# Patient Record
Sex: FEMALE | Race: OTHER | URBAN - METROPOLITAN AREA
[De-identification: names, ages, dates, MRNs, and addresses within clinical notes are randomized per-mention and may not be internally consistent; named-entity substitution may affect disease eponyms.]

---

## 2020-02-13 ENCOUNTER — LAB ONLY (OUTPATIENT)
Dept: FAMILY MEDICINE CLINIC | Age: 35
End: 2020-02-13

## 2020-02-13 ENCOUNTER — INITIAL PRENATAL (OUTPATIENT)
Dept: FAMILY MEDICINE CLINIC | Age: 35
End: 2020-02-13

## 2020-02-13 VITALS
HEART RATE: 74 BPM | SYSTOLIC BLOOD PRESSURE: 118 MMHG | BODY MASS INDEX: 32.79 KG/M2 | TEMPERATURE: 98.1 F | WEIGHT: 167 LBS | HEIGHT: 60 IN | DIASTOLIC BLOOD PRESSURE: 65 MMHG

## 2020-02-13 DIAGNOSIS — Z23 ENCOUNTER FOR IMMUNIZATION: ICD-10-CM

## 2020-02-13 DIAGNOSIS — G43.001 MIGRAINE WITHOUT AURA AND WITH STATUS MIGRAINOSUS, NOT INTRACTABLE: ICD-10-CM

## 2020-02-13 DIAGNOSIS — N91.2 AMENORRHEA: Primary | ICD-10-CM

## 2020-02-13 DIAGNOSIS — Z13.9 ENCOUNTER FOR SCREENING: Primary | ICD-10-CM

## 2020-02-13 LAB
HCG URINE, QL. (POC): POSITIVE
VALID INTERNAL CONTROL?: YES

## 2020-02-13 NOTE — PROGRESS NOTES
Current pregnancy history:    Susannah Renner is a  30 y.o. female OTHER Patient's last menstrual period was 2020 (exact date). .  She presents for the evaluation of amenorrhea and a positive pregnancy test.  Also c/o HA that comes and goes c/w migraine with mild blurred vision    LMP history:  The date of her LMP is  certain. Her last menstrual period was normal.  A urine pregnancy test was positive      Based on her LMP, her EDC is 10/15/2020 and her EGA is 5 weeks,1 days. Her menstrual cycles are regular and occur approximately every 28 days  and range from 3 to 5 days. Ultrasound data:  She is too early to establish viability today and pt is aware    Pregnancy symptoms:    Since her LMP she has experienced  urinary frequency, breast tenderness, and nausea. She has not been vomiting over the last few weeks. Associated signs and symptoms which she denies: dysuria, discharge, vaginal bleeding. Relevant past pregnancy history:   She has the following pregnancy history: Previous C/S x 2, both in [place]. Female 10 yrs of age and Son 10 yrs of age. Prmary C/S for prolonged Latent Phase of labor without progression. Reports 2nd C/S was for same   She has no history of  delivery. Relevant past medical history:(relevant to this pregnancy): noncontributory. Substance history: negative for alcohol, tobacco and street drugs. Positive for nothing. Exposure history: There is/are no indoor cat/s in the home. The patient was instructed to not change the cat litter. She admits close contact with children on a regular basis. She has had chicken pox or the vaccine in the past.   Patient denies issues with domestic violence. Genetic Screening/Teratology Counseling: (Includes patient, baby's father, or anyone in either family with:)  3.  Patient's age >/= 28 at EDC?--no  2.   [facility] ([facility] [place], [address], or  background): MCV<80?--no.     3. Neural tube defect (meningomyelocele, spina bifida, anencephaly)?--no.   4.  Congenital heart defect?--no.  5.  Down syndrome?--no.   6.  Shane-Sachs ([place], [employer])?--no.   7.  Canavan's Disease?--no.   8.  Familial Dysautonomia?--no.   9.  Sickle cell disease or trait ()? --no   The patient has not been tested for sickle trait  10. Hemophilia or other blood disorders?--no. 11.  Muscular dystrophy?--no. 12.  Cystic fibrosis?--no. 13.  Roger's Chorea?--no. 14.  Mental retardation/autism (if yes was person tested for Fragile X)?--no. 15.  Other inherited genetic or chromosomal disorder?--no. 12.  Maternal metabolic disorder (DM, PKU, etc)?--no. 17.  Patient or FOB with a child with a birth defect not listed above?--no.  17a. Patient or FOB with a birth defect themselves?--no. 18.  Recurrent pregnancy loss, or stillbirth?--no. 19.  Any medications since LMP other than prenatal vitamins (include vitamins, supplements, OTC meds, drugs, alcohol)?--no. 20.  Any other genetic/environmental exposure to discuss?--no. Infection History:  1. Lives with someone with TB or TB exposed?--no.   2.  Patient or partner has history of genital herpes?--no.  3.  Rash or viral illness since LMP?--no.    4.  History of STD (GC, CT, HPV, syphilis, HIV)? --no   5. Other: OTHER? History reviewed. No pertinent past medical history. History reviewed. No pertinent surgical history. Social History     Occupational History    Not on file   Tobacco Use    Smoking status: Never Smoker    Smokeless tobacco: Never Used   Substance and Sexual Activity    Alcohol use: Not Currently    Drug use: Not Currently    Sexual activity: Yes     Partners: Male     Birth control/protection: None     History reviewed. No pertinent family history.   OB History    Para Term  AB Living   3 2 2         SAB TAB Ectopic Molar Multiple Live Births             2      # Outcome Date GA Lbr Ashok/2nd Weight Sex Delivery Anes PTL Lv   3 Current            2 Term            1 Term              No Known Allergies  Prior to Admission medications    Not on File        Review of Systems: History obtained from the patient  Constitutional: negative for weight loss, fever, night sweats  HEENT: negative for hearing loss, earache, congestion, snoring, sore throat  CV: negative for chest pain, palpitations, edema  Resp: negative for cough, shortness of breath, wheezing  Breast: negative for breast lumps, nipple discharge, galactorrhea  GI: negative for change in bowel habits, abdominal pain, black or bloody stools  : negative for frequency, dysuria, hematuria, vaginal discharge  MSK: negative for back pain, joint pain, muscle pain  Skin: negative for itching, rash, hives  Neuro: negative for dizziness, headache, confusion, weakness  Psych: negative for anxiety, depression, change in mood  Heme/lymph: negative for bleeding, bruising, pallor    Objective:  Visit Vitals  /65   Pulse 74   Temp 98.1 °F (36.7 °C)   Ht 5' (1.524 m)   Wt 167 lb (75.8 kg)   LMP 01/08/2020 (Exact Date)   BMI 32.61 kg/m²       Physical Exam:     Constitutional  · Appearance: well-nourished, well developed, alert, in no acute distress    HENT  · Head  · Face: appears normal  · Eyes: appear normal  · Ears: normal  · Mouth: normal  · Lips: no lesions      Chest  · Respiratory Effort: breathing unlabored, LCTAB     Cardiovascular  · Heart:  · Auscultation: regular rate and rhythm without murmur      Gastrointestinal  · Abdominal Examination: abdomen non-tender to palpation, normal bowel sounds, no masses present, C/S scar noted without keloid  · Liver and spleen: no hepatomegaly present, spleen not palpable  · Hernias: no hernias identified  · Neg CVA tenderness Bilat    Genitourinary  · deferred    Skin  · General Inspection: no rash, no lesions identified    Neurologic/Psychiatric  · Mental Status:  · Orientation: grossly oriented to person, place and time  · Mood and Affect: mood normal, affect appropriate    Assessment:   Intrauterine pregnancy with issues addressed in problem list  Prev C/S x 2 with desired Repeat and BTL    Plan:     Course of pregnancy discussed including visit schedule, routine U/S, glucola testing, etc.  Avoid alcoholic beverages and illicit/recreational drugs use  Take prenatal vitamins or folic acid daily. Hospital and practice style discussed with coverage system. Discussed nutrition, toxoplasmosis precautions, sexual activity, exercise, need for influenza vaccine, environmental and work hazards, travel advice, screen for domestic violence, need for seat belts. Discussed seafood, unpasteurized dairy products, deli meat, artificial sweeteners, and caffeine. Discussed current prescription drug use. Given medication list.  Discussed the use of over the counter medications and chemicals. Route of delivery discussed, including risks, benefits     Handouts given to pt. Flu vaccine today  RTO 4 wks for Sono and NOB labs  Increase hydration, Tylenol prn and small amt of caffeine    Dana E. Margaretta Spatz, WHNP/ARTURO

## 2020-02-13 NOTE — PROGRESS NOTES
Results for orders placed or performed in visit on 02/13/20   AMB POC URINE PREGNANCY TEST, VISUAL COLOR COMPARISON   Result Value Ref Range    VALID INTERNAL CONTROL POC Yes     HCG urine, Ql. (POC) Positive Negative     [id number]  Exp:12/31/20    LMP:1/8/20

## 2020-02-13 NOTE — PROGRESS NOTES
Susannah Seymour  Requests flu vaccine. Denies fever and egg allergy. VIS information sheet given to patient. Explained possible s/e. Reviewed s/sx indicating need to be seen in ER. Patient had no adverse reaction at time of discharge. Entered into VIIS. GIVEN BY PATRICIO HAWKINS, [facility]  Edgardo Hodge RN

## 2020-03-06 DIAGNOSIS — N30.01 ACUTE CYSTITIS WITH HEMATURIA: Primary | ICD-10-CM

## 2020-03-06 RX ORDER — NITROFURANTOIN 25; 75 MG/1; MG/1
100 CAPSULE ORAL 2 TIMES DAILY
Qty: 14 CAP | Refills: 0 | Status: SHIPPED | OUTPATIENT
Start: 2020-03-06 | End: 2020-03-13

## 2020-03-06 NOTE — PROGRESS NOTES
Spoke to patient on the phone with [facility]  # [id number]. She states she is having pain with urination and her urine is \"black. \"  Also complains of lower back pain. Arranged to send prescription to Charlene at [facility]. Patient states she does not know where the nearest pharmacy is located. She is concerned about the cost.  Reviewed precautions. Will go to urgent care for fever, increased pain or N/V. She also complains that her feet are very hot. Explained that this is a common complaint for pregnant women. Will see at the [facility] 3/12/20.

## 2020-03-09 PROBLEM — Z34.80 ENCOUNTER FOR SUPERVISION OF OTHER NORMAL PREGNANCY, UNSPECIFIED TRIMESTER: Status: ACTIVE | Noted: 2020-03-09

## 2020-03-12 ENCOUNTER — ROUTINE PRENATAL (OUTPATIENT)
Dept: FAMILY MEDICINE CLINIC | Age: 35
End: 2020-03-12

## 2020-03-12 ENCOUNTER — HOSPITAL ENCOUNTER (OUTPATIENT)
Dept: LAB | Age: 35
Discharge: HOME | End: 2020-03-12

## 2020-03-12 VITALS — SYSTOLIC BLOOD PRESSURE: 114 MMHG | BODY MASS INDEX: 33.88 KG/M2 | DIASTOLIC BLOOD PRESSURE: 60 MMHG | WEIGHT: 173.5 LBS

## 2020-03-12 DIAGNOSIS — O09.91 SUPERVISION OF HIGH-RISK PREGNANCY, FIRST TRIMESTER: Primary | ICD-10-CM

## 2020-03-12 DIAGNOSIS — Z3A.09 9 WEEKS GESTATION OF PREGNANCY: ICD-10-CM

## 2020-03-12 DIAGNOSIS — O09.91 SUPERVISION OF HIGH-RISK PREGNANCY, FIRST TRIMESTER: ICD-10-CM

## 2020-03-12 PROBLEM — Z98.891 HISTORY OF CESAREAN DELIVERY: Status: ACTIVE | Noted: 2020-03-12

## 2020-03-12 LAB
ANTIBODY SCREEN, EXTERNAL: NEGATIVE
BASOPHILS # BLD: 0 K/UL (ref 0–0.1)
BASOPHILS NFR BLD: 0 % (ref 0–1)
CHLAMYDIA, EXTERNAL: NEGATIVE
CHLAMYDIA, EXTERNAL: NORMAL
DIFFERENTIAL METHOD BLD: ABNORMAL
EOSINOPHIL # BLD: 0.2 K/UL (ref 0–0.4)
EOSINOPHIL NFR BLD: 2 % (ref 0–7)
ERYTHROCYTE [DISTWIDTH] IN BLOOD BY AUTOMATED COUNT: 12.7 % (ref 11.5–14.5)
HBSAG, EXTERNAL: NEGATIVE
HCT VFR BLD AUTO: 37 % (ref 35–47)
HCT, EXTERNAL: 37
HGB BLD-MCNC: 11.8 G/DL (ref 11.5–16)
HGB, EXTERNAL: 11.8
HIV, EXTERNAL: NEGATIVE
IMM GRANULOCYTES # BLD AUTO: 0 K/UL (ref 0–0.04)
IMM GRANULOCYTES NFR BLD AUTO: 0 % (ref 0–0.5)
LYMPHOCYTES # BLD: 2.4 K/UL (ref 0.8–3.5)
LYMPHOCYTES NFR BLD: 21 % (ref 12–49)
MCH RBC QN AUTO: 29.9 PG (ref 26–34)
MCHC RBC AUTO-ENTMCNC: 31.9 G/DL (ref 30–36.5)
MCV RBC AUTO: 93.9 FL (ref 80–99)
MONOCYTES # BLD: 0.8 K/UL (ref 0–1)
MONOCYTES NFR BLD: 7 % (ref 5–13)
N. GONORRHEA, EXTERNAL: NEGATIVE
NEUTS SEG # BLD: 8.2 K/UL (ref 1.8–8)
NEUTS SEG NFR BLD: 70 % (ref 32–75)
NRBC # BLD: 0 K/UL (ref 0–0.01)
NRBC BLD-RTO: 0 PER 100 WBC
PLATELET # BLD AUTO: 369 K/UL (ref 150–400)
PLATELET CNT,   EXTERNAL: 369
PMV BLD AUTO: 12.2 FL (ref 8.9–12.9)
RBC # BLD AUTO: 3.94 M/UL (ref 3.8–5.2)
RPR, EXTERNAL: NEGATIVE
TYPE, ABO & RH, EXTERNAL: NORMAL
URINALYSIS, EXTERNAL: NEGATIVE
WBC # BLD AUTO: 11.7 K/UL (ref 3.6–11)

## 2020-03-12 PROCEDURE — 87661 TRICHOMONAS VAGINALIS AMPLIF: CPT

## 2020-03-12 PROCEDURE — 85025 COMPLETE CBC W/AUTO DIFF WBC: CPT

## 2020-03-12 PROCEDURE — 88175 CYTOPATH C/V AUTO FLUID REDO: CPT

## 2020-03-12 PROCEDURE — 87389 HIV-1 AG W/HIV-1&-2 AB AG IA: CPT

## 2020-03-12 PROCEDURE — 86900 BLOOD TYPING SEROLOGIC ABO: CPT

## 2020-03-12 PROCEDURE — 86762 RUBELLA ANTIBODY: CPT

## 2020-03-12 PROCEDURE — 87340 HEPATITIS B SURFACE AG IA: CPT

## 2020-03-12 PROCEDURE — 86592 SYPHILIS TEST NON-TREP QUAL: CPT

## 2020-03-12 PROCEDURE — 87624 HPV HI-RISK TYP POOLED RSLT: CPT

## 2020-03-12 NOTE — PROGRESS NOTES
S: Feeling well. No significant complaints or concerns. Fetal movement not appreciated yet. . Denies ctxs, LOF, or vaginal bldng. Denies travel to [facility] affected area. C/O left hip pain, HA's (eyes burning). Has been treated for UTI    O: See prenatal flowsheet for maternal and fetal exam  Blood pressure 114/60, weight 173 lb 8 oz (78.7 kg), last menstrual period 2020. Informal ultrasound for dating  CRL gives dates of 9w4d with JESSICA 10/11/20. Dates by LMP JESSICA 10/14/20. Will use LMP for dating. A:  9w1d   Size=Dates    P:   Reviewed stretches for hip. Encourage increase water intake for HA's. Reviewed nutrition - eating smaller meals throughout the day.   NOB labs ordered  Orders Placed This Encounter    CULTURE, URINE     Standing Status:   Future     Standing Expiration Date:   2020    CBC WITH AUTOMATED DIFF     Standing Status:   Future     Standing Expiration Date:   2020    RUBELLA AB, IGG     Standing Status:   Future     Standing Expiration Date:   2020    RPR     Standing Status:   Future     Standing Expiration Date:   2020    HIV 1/2 AG/AB, 4TH GENERATION,W RFLX CONFIRM     Standing Status:   Future     Standing Expiration Date:   2020    HEP B SURFACE AG     Standing Status:   Future     Standing Expiration Date:   2020    GLUCOSE, GESTATIONAL 1 HR TOLERANCE     Standing Status:   Future     Standing Expiration Date:   2020    BLOOD TYPE, (ABO+RH)     Standing Status:   Future     Standing Expiration Date:   2020    PAP IG, CT-NG, RFX APTIMA HPV ASCUS ([id number], [id number]))       See prenatal checklist for other topics covered during today's visit  RTO in 5 weeks for JOSEPH with ARTURO

## 2020-03-12 NOTE — PATIENT INSTRUCTIONS
Semanas 6 a 10 de falcon embarazo: Instrucciones de cuidado  Weeks 6 to 10 of Your Pregnancy: Care Instructions  Instrucciones de cuidado    Felicitaciones por falcon embarazo. Ankita momento es muy emocionante e importante para usted. [place] primeras 6 a 10 semanas de [place], falcon cuerpo [place] por muchos cambios. Falcon bebé crece muy rápido, a pesar de que usted no pueda sentirlo aún. Daiva Brieayama a notar que se siente distinta, tanto física merry emocionalmente. Dado que el embarazo de cada jenn es [place], no existe isabela manera correcta de sentirse. Podría sentirse más saludable que nunca o podría sentirse cansada o tener molestias estomacales (\"náuseas matutinas\"). Estas primeras semanas son un tiempo para karl decisiones saludables y comer los mejores alimentos para usted y falcon bebé. Esta hoja de cuidados le dará [employer]. Ankita es también un buen momento para pensar acerca de las pruebas para detectar defectos congénitos. Estas son las pruebas que se hacen shanta el embarazo para buscar posibles problemas con el bebé. Las pruebas del primer trimestre para detectar defectos de nacimiento se pueden hacer entre las semanas 10 y 15 del embarazo, dependiendo de la prueba. Hable con falcon médico acerca de qué tipos de pruebas existen. La atención de seguimiento es isabela parte clave de falcon tratamiento y seguridad. Asegúrese de hacer y acudir a todas las citas, y llame a falcon médico si está teniendo problemas. También es isabela buena idea saber los resultados de ruslan exámenes y mantener isabela lista de los medicamentos que hiram. ¿Cómo puede cuidarse en el hogar? Aliméntese parag  · Coma al menos 3 comidas y 2 refrigerios saludables todos los días. Coma alimentos frescos y enteros, incluyendo:  ? 7 o más porciones de pan, tortillas, cereales, arroz, pastas o ac. ? 3 o más porciones de verduras, en especial las de hojas verdes. ? 2 o más porciones de frutas. ? 3 o más porciones de [place], [place] o [place].   ? 2 o más porciones de carne, pavo, shailesh, pescado, huevos o frijoles (habichuelas) secos. · Alysa abundantes líquidos, sobre todo agua. Evite beber sodas y otras bebidas endulzadas. · Elija alimentos que contengan vitaminas que son importantes para falcon bebé, merry calcio, mackenzie y ácido fólico.  ? Los lácteos, el tofu, el pescado enlatado con espinas, las [place], el brócoli, las verduras de hojas verdes, las tortillas de maíz y el jugo de naranja fortificado son buenas duvall de calcio. ? La carne, las aves, el hígado, la [place], las [place], los frijoles secos, los cereales fortificados y las frutas secas son ricos en mackenzie. ? Las verduras de hojas oscuras, el brócoli, los [place], el hígado, los cereales fortificados, el jugo de [place], los cacahuates [place]) y las almendras son buenas duvall de ácido fólico.  · Evite comer alimentos que podrían hacerle daño al bebé. ? No coma carne, shailesh o pescado crudos o semicocidos (merry sushi u ostras crudas). ? No coma huevos crudos ni alimentos que contengan huevos crudos, merry el aderezo para [place]. ? No coma quesos blandos ni lácteos sin pasteurizar, merry queso \"brie\", feta o queso jake. ? No consuma pescados que [place] de lukas, merry tiburón, pez helen, blanquillo o caballa gigante. No coma más de 6 onzas (170 g) de atún por semana. ? No coma brotes crudos, especialmente de alfalfa. ? Reduzca las bebidas que contengan cafeína, merry el café, el té y las canelo. Protéjase y proteja a falcon bebé  · No toque las minesh de excrementos del [place]. Pueden causarle isabela infección que podría hacerle daño al bebé. · La temperatura corporal bing puede ser perjudicial para falcon bebé. Así es que si [place] usar un sauna o isabela bañera de hidromasaje, asegúrese de hablar con falcon médico acerca de cómo usarlos con seguridad. Cómo lidiar con las náuseas matutinas  · Scott pequeños sorbos de [place], [facility] o batidos.  Pruebe a beber [employer], no con las comidas. · Coma 5 o 6 comidas pequeñas al día. Pruebe con pan marla seco o galletas apenas se levante, y desayune un poco más tarde. · Evite los alimentos picantes, aceitosos o grasosos. · Cuando sienta malestar, rosa las ventanas o salga a caminar para karl aire fresco.  · Pruebe las pulseras antináuseas. Suelen ayudar a [employer]. · Dígale a falcon médico si nazia que ruslan vitaminas prenatales le causan malestar estomacal.  ¿Dónde puede encontrar más información en inglés? Vaya a http://[email or web address]-raymon.info/  [address] en la búsqueda para aprender más acerca de \"Semanas 6 a 10 de falcon embarazo: Instrucciones de cuidado. \"  Revisado: [address] [place], 2019Versión del contenido: 12.4  © 5739-3307 Naila [employer]orporated. Las instrucciones de cuidado fueron adaptadas bajo licencia por Good Help Connections (which disclaims liability or warranty for this information). Si usted tiene [place] afección médica o sobre estas instrucciones, siempre pregunte a falcon profesional de maximilian. [employer] niega toda garantía o responsabilidad por falcon uso de esta información.

## 2020-03-13 LAB
ABO + RH BLD: NORMAL
BLOOD BANK CMNT PATIENT-IMP: NORMAL
HBV SURFACE AG SER QL: 0.14 INDEX
HBV SURFACE AG SER QL: NEGATIVE
HIV 1+2 AB+HIV1 P24 AG SERPL QL IA: NONREACTIVE
HIV12 RESULT COMMENT, HHIVC: NORMAL
RPR SER QL: NONREACTIVE
RUBV IGG SER-IMP: REACTIVE
RUBV IGG SERPL IA-ACNC: 84.8 IU/ML

## 2020-03-17 LAB
C TRACH RRNA SPEC QL NAA+PROBE: NEGATIVE
N GONORRHOEA RRNA SPEC QL NAA+PROBE: NEGATIVE
SPECIMEN SOURCE: NORMAL
T VAGINALIS RRNA SPEC QL NAA+PROBE: NEGATIVE

## 2020-03-26 ENCOUNTER — OFFICE VISIT (OUTPATIENT)
Dept: OBGYN CLINIC | Age: 35
End: 2020-03-26

## 2020-03-26 VITALS — BODY MASS INDEX: 33.86 KG/M2 | DIASTOLIC BLOOD PRESSURE: 63 MMHG | WEIGHT: 173.4 LBS | SYSTOLIC BLOOD PRESSURE: 118 MMHG

## 2020-03-26 DIAGNOSIS — Z98.891 HISTORY OF CESAREAN DELIVERY: ICD-10-CM

## 2020-03-26 DIAGNOSIS — Z3A.11 11 WEEKS GESTATION OF PREGNANCY: Primary | ICD-10-CM

## 2020-03-26 DIAGNOSIS — Z34.80 ENCOUNTER FOR SUPERVISION OF OTHER NORMAL PREGNANCY, UNSPECIFIED TRIMESTER: ICD-10-CM

## 2020-03-26 NOTE — PROGRESS NOTES
S: Feeling well. No significant complaints or concerns. Fetal movement not appreciated yet. Denies ctxs, LOF, or vaginal bldng. Denies recent travel or symptoms of sickness    O: See prenatal flowsheet for maternal and fetal exam  Blood pressure 118/63, weight 173 lb 6.4 oz (78.7 kg), last menstrual period 2020.        A:  11w1d   Size=Dates    P:   Orders Placed This Encounter    CULTURE, URINE    ANTIBODY SCREEN       New OB packet given  Reviewed information  Reviewed virtual visits and prenatal schedule  See prenatal checklist for other topics covered during today's visit  Doxy virtual visit in 4-5 weeks         [id number] via [employer] used during visit

## 2020-03-27 LAB — BLD GP AB SCN SERPL QL: NEGATIVE

## 2020-03-28 LAB — BACTERIA UR CULT: NORMAL

## 2020-06-03 ENCOUNTER — HOSPITAL ENCOUNTER (OUTPATIENT)
Dept: PERINATAL CARE | Age: 35
Discharge: HOME | End: 2020-06-03
Attending: OBSTETRICS & GYNECOLOGY
Payer: SELF-PAY

## 2020-06-03 ENCOUNTER — ROUTINE PRENATAL (OUTPATIENT)
Dept: OBGYN CLINIC | Age: 35
End: 2020-06-03

## 2020-06-03 VITALS
DIASTOLIC BLOOD PRESSURE: 49 MMHG | BODY MASS INDEX: 33.57 KG/M2 | HEIGHT: 60 IN | SYSTOLIC BLOOD PRESSURE: 103 MMHG | WEIGHT: 171 LBS

## 2020-06-03 DIAGNOSIS — Z34.80 ENCOUNTER FOR SUPERVISION OF OTHER NORMAL PREGNANCY, UNSPECIFIED TRIMESTER: Primary | ICD-10-CM

## 2020-06-03 DIAGNOSIS — R93.89 ABNORMAL FINDING ON ULTRASOUND: ICD-10-CM

## 2020-06-03 LAB
Lab: NORMAL
NIPT, EXTERNAL: NORMAL
RUBELLA, EXTERNAL: NORMAL
TOXOPLASMA, EXTERNAL: NORMAL
VARICELLA, EXTERNAL: NORMAL

## 2020-06-03 PROCEDURE — 76805 OB US >/= 14 WKS SNGL FETUS: CPT | Performed by: OBSTETRICS & GYNECOLOGY

## 2020-06-03 NOTE — PROGRESS NOTES
Problem List  Date Reviewed: 2020          Codes Class Noted    History of  delivery ICD-10-CM: Z98.891  ICD-9-CM: V45.89  3/12/2020        Encounter for supervision of other normal pregnancy, unspecified trimester ICD-10-CM: Z34.80  ICD-9-CM: V22.1  3/9/2020              FETAL SURVEY  DECREASED SCAN CLARITY DUE TO MATERNAL BODY HABITUS. A SINGLE VIABLE IUP AT 21W0D GA BY LMP. FETAL CARDIAC MOTION OBSERVED. FETAL ANATOMY VISUALIZED. THERE APPEARS TO BE SKIN EDEMA IN THE FETAL FACE AND CHEST/ABDOMINAL AREA. APPROPRIATE GROWTH MEASURED; SIZE = DATES. SEVERO, CERVIX AND PLACENTA APPEAR WITHIN NORMAL LIMITS. GENDER: XY, PATIENT DOES NOT KNOW.

## 2020-06-03 NOTE — PATIENT INSTRUCTIONS
Semanas 18 a 22 de falcon embarazo: Instrucciones de cuidado Weeks 18 to 22 of Your Pregnancy: Care Instructions Instrucciones de cuidado Falcon bebé continúa desarrollándose rápidamente. En esta etapa, los bebés ya pueden chuparse el pulgar, agarrar firmemente con las [place], y abrir y cerrar los párpados. En algún Jade's 18 y 25, comenzará a sentir que el bebé se [place]. Al principio, estos pequeños movimientos se sentirán merry un aleteo o un vuelo de mariposas. Algunas mujeres dicen que sienten merry burbujas de [place]. A medida que el bebé crece, estos movimientos serán más joaquin. También podría observar que falcon bebé patea y tiene hipo. Shanta zen Solis, podría descubrir que las náuseas y la fatiga desaparecieron. En general, es posible que se sienta mejor y tenga más energía que la que tenía shanta el primer trimestre. Sin embargo, también podría tener nuevas ANDOVER, merry problemas para dormir o calambres en las piernas. Esta hoja de cuidados la ayudará a aliviar esas molestias. La atención de seguimiento es isabela parte clave de falcon tratamiento y seguridad. Asegúrese de hacer y acudir a todas las citas, y llame a falcon médico si está teniendo problemas. También es isabela buena idea saber los resultados de ruslan exámenes y mantener isabela lista de los medicamentos que hiram. Cómo puede cuidarse en el hogar? Alivie los problemas para dormir · Evite la cafeína en las bebidas o los chocolates a última hora del día. · Misbah algo de ejercicio todos los días. · Dúchese o báñese en agua tibia antes de irse a la cama. · Coma un refrigerio liviano o shai un vaso de leche a la hora de dormir. · Misbah ejercicios de relajación en la cama para tranquilizar falcon mente y falcon cuerpo. · Apoye ruslan piernas y falcon espalda con almohadas adicionales. Si duerme de costado, pruebe a ponerse isabela [employer] ruslan piernas. · No use píldoras para dormir ni consuma alcohol. Podrían hacerle daño a falcon bebé. Alivie los [employer] piernas · No masajee mac pantorrilla mientras tiene un calambre. · Siéntese en isabela cama o silla firme. Estire la wilber y [employer] ([facility] el [facility]) despacio, [place] jillian, en dirección a la rodilla. Doble los dedos de los pies hacia arriba y [place]. · Póngase de pie sobre isabela superficie plana y fresca. Estire los dedos de los pies hacia arriba y dé pequeños pasos con el talón. · Use isabela almohadilla térmica o isabela bolsa de [place] para aliviar arnie musculares. Prevenga los calambres en las piernas · Asegúrese de consumir suficiente calcio. Si está preocupada porque no está obteniendo lo suficiente, hable con mac médico. 
· Misbah ejercicio todos los madyson y estire las piernas antes de irse a dormir. · Dese un baño tibio antes de irse a dormir y pruebe a usar calentadores de piernas. Dónde puede encontrar más información en inglés? Darleen Kirby a http://ervin-raymon.info/ [place] U066 en la búsqueda para aprender más acerca de \"Semanas 18 a 22 de mac embarazo: Instrucciones de cuidado. \" Revisado: [address] [place], 2019Versión del contenido: 12.4 © 0048-7025 Healthwise, Incorporated. Las instrucciones de cuidado fueron adaptadas bajo licencia por Good Help Connections (which disclaims liability or warranty for this information). Si usted tiene [place] afección médica o sobre estas instrucciones, siempre pregunte a mac profesional de maximilian. Healthwise, Incorporated niega toda garantía o responsabilidad por mac uso de esta información. Semanas 18 a 22 de mac embarazo: Instrucciones de cuidado Weeks 18 to 22 of Your Pregnancy: Care Instructions Instrucciones de cuidado Mac bebé continúa desarrollándose rápidamente. En esta etapa, los bebés ya pueden chuparse el pulgar, agarrar firmemente con las [place], y abrir y cerrar los párpados.  
En algún Jade's 18 y 25, comenzará a sentir que el bebé se mueve. Al principio, estos pequeños movimientos se sentirán merry un aleteo o un vuelo de mariposas. Algunas mujeres dicen que sienten merry burbujas de [place]. A medida que el bebé crece, estos movimientos serán más joaquin. También podría observar que falcon bebé patea y tiene hipo. Shanta zen Solis, podría descubrir que las náuseas y la fatiga desaparecieron. En general, es posible que se sienta mejor y tenga más energía que la que tenía shanta el primer trimestre. Sin embargo, también podría tener nuevas ANDOVER, merry problemas para dormir o calambres en las piernas. Esta hoja de cuidados la ayudará a aliviar esas molestias. La atención de seguimiento es isabela parte clave de falcon tratamiento y seguridad. Asegúrese de hacer y acudir a todas las citas, y llame a falcon médico si está teniendo problemas. También es isabela buena idea saber los resultados de ruslan exámenes y mantener isabela lista de los medicamentos que hiram. Cómo puede cuidarse en el hogar? Alivie los problemas para dormir · Evite la cafeína en las bebidas o los chocolates a última hora del día. · Misbah algo de ejercicio todos los días. · Dúchese o báñese en agua tibia antes de irse a la cama. · Coma un refrigerio liviano o shai un vaso de leche a la hora de dormir. · Misbah ejercicios de relajación en la cama para tranquilizar falcon mente y falcon cuerpo. · Apoye ruslan piernas y falcon espalda con almohadas adicionales. Si duerme de costado, pruebe a ponerse isabela [employer] ruslan piernas. · No use píldoras para dormir ni consuma alcohol. Podrían hacerle daño a falcon bebé. Alivie los [employer] pierclaudio · No masajee falcon pantorrilla mientras tiene un calambre. · Siéntese en isabela cama o silla firme. Estire la wilber y [place] ([facility] el [facility]) despacio, [place] jillian, en dirección a la rodilla. Doble los dedos de los pies hacia jillian y Shellie ALEJANDRA. · Póngase de pie sobre isabela superficie plana y fresca.  Estire los dedos de los pies hacia arriba y dé pequeños pasos con el talón. · Use isabela almohadilla térmica o isabela bolsa de [place] para aliviar arnie musculares. Prevenga los calambres en las piernas · Asegúrese de consumir suficiente calcio. Si está preocupada porque no está obteniendo lo suficiente, hable con falcon médico. 
· Misbah ejercicio todos los madyson y estire las piernas antes de irse a dormir. · Dese un baño tibio antes de irse a dormir y pruebe a usar calentadores de piernas. Dónde puede encontrar más información en inglés? Jose Elias tim http://[email or web address]-raymon.info/ Isela Webb A381 en la búsqueda para aprender más acerca de \"Semanas 18 a 22 de falcon embarazo: Instrucciones de cuidado. \" Revisado: [address] [place], 2019Versión del contenido: 12.4 © 0987-2310 Healthwise, [employer]. Las instrucciones de cuidado fueron adaptadas bajo licencia por Good Help Connections (which disclaims liability or warranty for this information). Si usted tiene [place] afección médica o sobre estas instrucciones, siempre pregunte a falcon profesional de maximilian. [place], [employer] niega toda garantía o responsabilidad por falcon uso de esta información.

## 2020-06-05 LAB
B19V IGG SER IA-ACNC: 0.2 INDEX (ref 0–0.8)
B19V IGM SER IA-ACNC: 0.2 INDEX (ref 0–0.8)
CMV IGG SERPL IA-ACNC: >10 U/ML (ref 0–0.59)
CMV IGM SERPL IA-ACNC: <30 AU/ML (ref 0–29.9)
COMMENT, 096659: NORMAL
HSV1 IGG SER IA-ACNC: 12.2 INDEX (ref 0–0.9)
HSV1+2 IGM SER IA-ACNC: 1.98 RATIO (ref 0–0.9)
HSV2 IGG SER IA-ACNC: 11.1 INDEX (ref 0–0.9)
RUBV IGG SERPL IA-ACNC: 3.63 INDEX
RUBV IGM SER IA-ACNC: <20 AU/ML (ref 0–19.9)
SARS-COV-2 AB, IGG, CORG1M: NEGATIVE
T GONDII IGG SERPL IA-ACNC: 127 IU/ML (ref 0–7.1)
T GONDII IGM SER IA-ACNC: <3 AU/ML (ref 0–7.9)
VZV IGG SER IA-ACNC: 1525 INDEX

## 2020-06-16 DIAGNOSIS — Z34.80 ENCOUNTER FOR SUPERVISION OF OTHER NORMAL PREGNANCY, UNSPECIFIED TRIMESTER: ICD-10-CM

## 2020-06-18 ENCOUNTER — ROUTINE PRENATAL (OUTPATIENT)
Dept: OBGYN CLINIC | Age: 35
End: 2020-06-18

## 2020-06-18 VITALS
DIASTOLIC BLOOD PRESSURE: 64 MMHG | HEIGHT: 60 IN | WEIGHT: 169 LBS | SYSTOLIC BLOOD PRESSURE: 110 MMHG | BODY MASS INDEX: 33.18 KG/M2

## 2020-06-18 DIAGNOSIS — Z34.80 ENCOUNTER FOR SUPERVISION OF OTHER NORMAL PREGNANCY, UNSPECIFIED TRIMESTER: Primary | ICD-10-CM

## 2020-06-18 DIAGNOSIS — R31.9 HEMATURIA, UNSPECIFIED TYPE: ICD-10-CM

## 2020-06-18 LAB
GTT, 1 HR, GLUCOLA, EXTERNAL: 115
HCT, EXTERNAL: 30.9
HGB, EXTERNAL: 11.5
PLATELET CNT,   EXTERNAL: 309
URINALYSIS, EXTERNAL: NORMAL

## 2020-06-18 NOTE — PROGRESS NOTES
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 so she can have BTL at the same time.  Due to convenience she wants to continue her [facility] at [facility] for now and we will transfer at end of pregnancy for her CS

## 2020-06-18 NOTE — PATIENT INSTRUCTIONS
[employer] Knowledge Desk: [phone] Weeks 22 to 26 of Your Pregnancy: Care Instructions Your Care Instructions As you enter your 7th month of pregnancy at week 26, your baby's lungs are growing stronger and getting ready to breathe. You may notice that your baby responds to the sound of your or your partner's voice. You may also notice that your baby does less turning and twisting and more squirming or jerking. Jerking often means that your baby has the hiccups. Hiccups are perfectly normal and are only temporary. You may want to think about attending a childbirth preparation class. This is also a good time to start thinking about whether you want to have pain medicine during labor. Most pregnant women are tested for gestational diabetes between weeks 25 and 28. Gestational diabetes occurs when your blood sugar level gets too high when you're pregnant. The test is important, because you can have gestational diabetes and not know it. But the condition can cause problems for your baby. Follow-up care is a key part of your treatment and safety. Be sure to make and go to all appointments, and call your doctor if you are having problems. It's also a good idea to know your test results and keep a list of the medicines you take. How can you care for yourself at home? Ease discomfort from your baby's kicking · Change your position. Sometimes this will cause your baby to change position too. · Take a deep breath while you raise your arm over your head. Then breathe out while you drop your arm. Do Kegel exercises to prevent urine from leaking · You can do Kegel exercises while you stand or sit. ? Squeeze the same muscles you would use to stop your urine. Your belly and thighs should not move. ? Hold the squeeze for 3 seconds, and then relax for 3 seconds. ? Start with 3 seconds. Then add 1 second each week until you are able to squeeze for 10 seconds. ? Repeat the exercise 10 to 15 times for each session. Do three or more sessions each day. Ease or reduce swelling in your feet, ankles, hands, and fingers · If your fingers are puffy, take off your rings. · Do not eat high-salt foods, such as potato chips. · Prop up your feet on a stool or couch as much as possible. Sleep with pillows under your feet. · Do not stand for long periods of time or wear tight shoes. · Wear support stockings. Where can you learn more? Go to http://[email or web address].info/ Enter G264 in the search box to learn more about \"Weeks 22 to 26 of Your Pregnancy: Care Instructions. \" Current as of: May 29, 2019Content Version: 12.4 © 3367-1545 Healthwise, Incorporated. Care instructions adapted under license by [employer] (which disclaims liability or warranty for this information). If you have questions about a medical condition or this instruction, always ask your healthcare professional. [address] any warranty or liability for your use of this information.

## 2020-06-21 LAB
BACTERIA UR CULT: ABNORMAL
ERYTHROCYTE [DISTWIDTH] IN BLOOD BY AUTOMATED COUNT: 12.6 % (ref 11.7–15.4)
GLUCOSE 1H P 50 G GLC PO SERPL-MCNC: 115 MG/DL (ref 65–139)
HCT VFR BLD AUTO: 30.9 % (ref 34–46.6)
HGB BLD-MCNC: 10.4 G/DL (ref 11.1–15.9)
MCH RBC QN AUTO: 30.2 PG (ref 26.6–33)
MCHC RBC AUTO-ENTMCNC: 33.7 G/DL (ref 31.5–35.7)
MCV RBC AUTO: 90 FL (ref 79–97)
PLATELET # BLD AUTO: 309 X10E3/UL (ref 150–450)
RBC # BLD AUTO: 3.44 X10E6/UL (ref 3.77–5.28)
WBC # BLD AUTO: 7.4 X10E3/UL (ref 3.4–10.8)

## 2020-06-22 RX ORDER — CEPHALEXIN 500 MG/1
500 CAPSULE ORAL 3 TIMES DAILY
Qty: 21 CAP | Refills: 0 | Status: SHIPPED | OUTPATIENT
Start: 2020-06-22 | End: 2020-06-29

## 2020-07-02 ENCOUNTER — TELEPHONE (OUTPATIENT)
Dept: OBGYN CLINIC | Age: 35
End: 2020-07-02

## 2020-07-02 DIAGNOSIS — Z34.80 ENCOUNTER FOR SUPERVISION OF OTHER NORMAL PREGNANCY, UNSPECIFIED TRIMESTER: ICD-10-CM

## 2020-07-02 NOTE — TELEPHONE ENCOUNTER
----- Message from Rose Schneider LPN sent at 0/3/8126  8:55 AM EDT -----  Updated PN flowsheet and problem list    Sent to [facility] to call the patient.

## 2020-07-15 ENCOUNTER — ROUTINE PRENATAL (OUTPATIENT)
Dept: OBGYN CLINIC | Age: 35
End: 2020-07-15

## 2020-07-15 ENCOUNTER — HOSPITAL ENCOUNTER (OUTPATIENT)
Dept: PERINATAL CARE | Age: 35
Discharge: HOME | End: 2020-07-15
Attending: OBSTETRICS & GYNECOLOGY

## 2020-07-15 VITALS
WEIGHT: 176 LBS | DIASTOLIC BLOOD PRESSURE: 53 MMHG | HEIGHT: 60 IN | BODY MASS INDEX: 34.55 KG/M2 | SYSTOLIC BLOOD PRESSURE: 102 MMHG

## 2020-07-15 DIAGNOSIS — Z34.80 ENCOUNTER FOR SUPERVISION OF OTHER NORMAL PREGNANCY, UNSPECIFIED TRIMESTER: Primary | ICD-10-CM

## 2020-07-15 DIAGNOSIS — Z23 ENCOUNTER FOR IMMUNIZATION: ICD-10-CM

## 2020-07-15 NOTE — PROGRESS NOTES
Mery Wheeler is a 30 y.o. female who presents for routine immunizations. She denies any symptoms , reactions or allergies that would exclude them from being immunized today. Risks and adverse reactions were discussed and the VIS was given to them. All questions were addressed. She was observed for 10 min post injection. There were no reactions observed.     Jose David Xiao

## 2020-07-15 NOTE — PROGRESS NOTES
Baby moving  Saw MFM EFW 63%tile and has fu in 6 weeks  Tdap today  glucola normal  Wants CS at [facility] so can have BTL

## 2020-07-15 NOTE — PROGRESS NOTES
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

## 2020-07-15 NOTE — PATIENT INSTRUCTIONS
Semanas 26 a 30 de falcon embarazo: Instrucciones de cuidado  Weeks 26 to 30 of Your Pregnancy: Care Instructions  Instrucciones de cuidado    Ahora usted se encuentra en el último trimestre del [facility]. Falcon bebé está creciendo rápidamente. Y es probable que sienta que falcon bebé se mueve con más frecuencia. Es posible que falcon médico le diga que cuente la cantidad de patadas que da falcon bebé. La espalda puede dolerle mientras falcon cuerpo se acostumbra al tamaño y a la longitud de falcon bebé. Si todavía no le schofield aplicado la vacuna Tdap (tétanos, difteria y tos [place]) shanta zen [facility], hable con falcon médico acerca de aplicársela. Arpita Mock a proteger a falcon recién nacido contra la infección por tos ferina. Shanta zen tiempo, es importante que se cuide y preste atención a lo que falcon cuerpo necesita. Si tiene [employer], busque formas de estar con falcon cathleen que satisfagan falcon nivel de comodidad y deseo. Utilice las recomendaciones proporcionadas en esta hoja de cuidados para encontrar falcon propia manera de vivir la sexualidad. La atención de seguimiento es isabela parte clave de falcon tratamiento y seguridad. Asegúrese de hacer y acudir a todas las citas, y llame a falcon médico si está teniendo problemas. También es isabela buena idea saber los resultados de ruslan exámenes y mantener isabela lista de los medicamentos que hiram. ¿Cómo puede cuidarse en el hogar? Andale falcon trabajo con calma  · Tómese descansos frecuentes. Si es posible, deje de trabajar cuando esté cansada y descanse shanta la hora del almuerzo. · Vaya al baño cada 2 horas. · Cambie de posición con frecuencia. Si está sentada shanta mucho tiempo, póngase de pie y camine. · Si está maddox shanta mucho tiempo, apoye un pie sobre un banco bajo, flexionando la rodilla. Después de estar maddox shanta mucho tiempo, siéntese con los pies elevados.   · Evite las [place], las sustancias químicas y el humo del tabaco.  Viva falcon sexualidad a falcon manera  · Apteegi 1 shanta el [employer], a menos que falcon médico le diga que no. · Podría tener un gran deseo sexual o estar completamente desinteresada. · El abdomen cada vez más garrett podría hacer difícil encontrar isabela buena posición shanta el coito. Experimente y explore. · Cuando falcon cathleen le toque los senos, podría tener contracciones en [place]. · Un masaje en la espalda podría aliviar el dolor de espalda o los cólicos que a veces se sienten después del [place]. Aprenda sobre el trabajo de parto prematuro  · Esté alerta a las señales del trabajo de parto prematuro. Es posible que esté comenzando el [place] de parto si:  ? Tiene cólicos similares a los del período menstrual, con o sin náuseas. ? Tiene aproximadamente 4 contracciones o más en 20 minutos, o alrededor de 8 o más en 1 hora, incluso después de jai tomado un vaso de agua y estar en reposo. ? Tiene un dolor sordo (leve brody continuo) en la norma baja de la espalda que no desaparece cuando cambia de posición. ? Siente dolor o presión en la pelvis que aparece y desaparece con determinada regularidad. ? Tiene espasmos intestinales o síntomas similares a los de la gripe, con o sin diarrea. ? Nota un aumento o un cambio en el flujo vaginal. El flujo podría ser Myrene Ferns, tener consistencia mucosa, ser Jerone Balsam rastros de [place]. ? Se le rompe la natalya. · Si nazia que ha comenzado un trabajo de parto prematuro:  ? Alysa 2 o 3 vasos de agua o jugo. No beber suficientes líquidos puede provocar contracciones. ? Detenga lo que esté haciendo, y vacíe la vejiga. Luego, acuéstese sobre el lado pramod shanta al menos 1 hora. ? Mientras descansa de joan, ubique falcon esternón. Coloque los dedos en el punto blando alberto debajo de él. Mueva los dedos hacia abajo en dirección al ombligo para encontrar la parte superior del [place]. Compruebe si está tenso. ? Las contracciones pueden ser débiles o intensas. Registre ruslan contracciones shanta isabela hora.  Cuente isabela contracción desde el comienzo de isabela hasta el comienzo de [place]. ? Enma Mercy contracciones joaquin que no ocurren con la regularidad de un patrón reciben el nombre de contracciones de [place]. Estas son \"contracciones de práctica\", brody no indican el inicio del [place]. Por lo general, se detienen si cambia de [place]. ? Si tiene contracciones regulares, llame a mac médico.  ¿Dónde puede encontrar más información en inglés? Nikki tim [email or web address].info/  Cuco [id number] en la búsqueda para aprender más acerca de \"Semanas 26 a 30 de mac embarazo: Instrucciones de cuidado. \"  Revisado: 11 febrero, 2020               Versión del contenido: 12.5  © 6173-0179 Healthwise, Incorporated. Las instrucciones de cuidado fueron adaptadas bajo licencia por Good [employer] (which disclaims liability or warranty for this information). Si usted tiene Sheridan Mims afección médica o sobre estas instrucciones, siempre pregunte a mac profesional de maximilian. Healthwise, Incorporated niega toda garantía o responsabilidad por mac uso de esta información. Semanas 26 a 30 de mac embarazo: Instrucciones de cuidado  Weeks 26 to 30 of Your Pregnancy: Care Instructions  Instrucciones de cuidado    Ahora usted se encuentra en el último trimestre del Eliana Damon. Mac bebé está creciendo rápidamente. Y es probable que sienta que mac bebé se mueve con más frecuencia. Es posible que mac médico le diga que cuente la cantidad de patadas que da mac bebé. La espalda puede dolerle mientras mac cuerpo se acostumbra al tamaño y a la longitud de mac bebé. Si todavía no le schofield aplicado la vacuna Tdap (tétanos, difteria y tos [place]) shanta zen Eliana Damon, hable con mac médico acerca de aplicársela. Guillermo Katherine proteger a mac recién nacido contra la infección por tos ferina. Shanta zen tiempo, es importante que se cuide y preste atención a lo que mac cuerpo necesita.  Si tiene [employer], busque formas de estar con falcon cathleen que satisfagan falcon nivel de comodidad y deseo. Utilice las recomendaciones proporcionadas en esta hoja de cuidados para encontrar falcon propia manera de vivir la sexualidad. La atención de seguimiento es isabela parte clave de falcon tratamiento y seguridad. Asegúrese de hacer y acudir a todas las citas, y llame a falcon médico si está teniendo problemas. También es isabela buena idea saber los resultados de ruslan exámenes y mantener isabela lista de los medicamentos que hiram. ¿Cómo puede cuidarse en el hogar? [place] falcon trabajo con calma  · Tómese descansos frecuentes. Si es posible, deje de trabajar cuando esté cansada y descanse shanta la hora del almuerzo. · Vaya al baño cada 2 horas. · Cambie de posición con frecuencia. Si está sentada shanta mucho tiempo, póngase de pie y camine. · Si está maddox shanta mucho tiempo, apoye un pie sobre un banco bajo, flexionando la rodilla. Después de estar maddox shanta mucho tiempo, siéntese con los pies elevados. · Evite las [employer], las sustancias químicas y el humo del tabaco.  Viva falcon sexualidad a falcon manera  · [place] relaciones sexuales shanta el embarazo está parag, a menos que falcon médico le diga que no. · Podría tener un gran deseo sexual o estar completamente desinteresada. · El abdomen cada vez más garrett podría hacer difícil encontrar isabela buena posición shanta el coito. Experimente y explore. · Cuando falcon cathleen le toque los senos, podría tener contracciones en [place]. · Un masaje en la espalda podría aliviar el dolor de espalda o los cólicos que a veces se sienten después del [place]. Aprenda sobre el trabajo de parto prematuro  · Esté alerta a las señales del trabajo de parto prematuro. Es posible que esté comenzando el [place] de parto si:  ? Tiene cólicos similares a los del período menstrual, con o sin náuseas.   ? Tiene aproximadamente 4 contracciones o más en 20 minutos, o alrededor de 8 o más en 1 hora, incluso después de jai tomado un vaso de agua y estar en reposo. ? Tiene un dolor sordo (leve brody continuo) en la norma baja de la espalda que no desaparece cuando cambia de posición. ? Siente dolor o presión en la pelvis que aparece y desaparece con determinada regularidad. ? Tiene espasmos intestinales o síntomas similares a los de la gripe, con o sin diarrea. ? Nota un aumento o un cambio en el flujo vaginal. El flujo podría ser [place], tener consistencia mucosa, ser Luna Coho rastros de [place]. ? Se le rompe la natalya. · Si nazia que ha comenzado un trabajo de parto prematuro:  ? Alysa 2 o 3 vasos de agua o jugo. No beber suficientes líquidos puede provocar contracciones. ? Detenga lo que esté haciendo, y vacíe la vejiga. Luego, acuéstese sobre el lado pramod shanta al menos 1 hora. ? Mientras descansa de costado, ubique falcon esternón. Coloque los dedos en el punto blando alberto debajo de él. Mueva los dedos hacia abajo en dirección al ombligo para encontrar la parte superior del [place]. Compruebe si está tenso. ? Las contracciones pueden ser débiles o intensas. Registre ruslan contracciones shanta isabela hora. Cuente isabela contracción desde el comienzo de isabela hasta el comienzo de [place]. ? Doc Netter contracciones joaquin que no ocurren con la regularidad de un patrón reciben el nombre de contracciones de [place]. Estas son \"contracciones de práctica\", brody no indican el inicio del [place]. Por lo general, se detienen si cambia de [place]. ? Si tiene contracciones regulares, llame a falcon médico.  ¿Dónde puede encontrar más información en inglés? Luh Cabrera a [email or web address].[email or web address]  Liang Hilario X402 en la búsqueda para aprender más acerca de \"Semanas 26 a 30 de falcon embarazo: Instrucciones de cuidado. \"  Revisado: 11 febrero, 2020               Versión del contenido: 12.5  © 2774-1927 Healthwise, Incorporated.    Las instrucciones de cuidado fueron adaptadas bajo licencia por [employer] (which disclaims liability or warranty for this information). Si usted tiene Landrum Criders afección médica o sobre estas instrucciones, siempre pregunte a falcon profesional de maximilian. [facility], Incorporated niega toda garantía o responsabilidad por falcon uso de esta información.

## 2020-07-17 LAB
BACTERIA UR CULT: ABNORMAL
BACTERIA UR CULT: ABNORMAL

## 2020-07-17 RX ORDER — CEPHALEXIN 500 MG/1
500 CAPSULE ORAL 3 TIMES DAILY
Qty: 21 CAP | Refills: 0 | Status: SHIPPED | OUTPATIENT
Start: 2020-07-17

## 2020-07-22 ENCOUNTER — TELEPHONE (OUTPATIENT)
Dept: OBGYN CLINIC | Age: 35
End: 2020-07-22

## 2020-07-22 NOTE — TELEPHONE ENCOUNTER
----- Message from Joanne Ramirez LPN sent at 6/57/2329  9:52 AM EDT -----  Will forward to [facility] to contact patient.

## 2020-07-22 NOTE — TELEPHONE ENCOUNTER
----- Message from Abbe Gaucher, MD sent at 7/17/2020  4:45 PM EDT -----  UTI - rx sent to pharmacy  Please contact patient and make sure she takes it - speaks Estonian

## 2020-07-22 NOTE — TELEPHONE ENCOUNTER
Patient notified and stated she already picked up Rx and completed them. She verbalized understanding.

## 2020-07-27 DIAGNOSIS — Z36.0 ENCOUNTER FOR ANTENATAL SCREENING FOR CHROMOSOMAL ANOMALIES: ICD-10-CM

## 2020-07-27 DIAGNOSIS — Z34.80 ENCOUNTER FOR SUPERVISION OF OTHER NORMAL PREGNANCY, UNSPECIFIED TRIMESTER: Primary | ICD-10-CM

## 2020-08-13 ENCOUNTER — ROUTINE PRENATAL (OUTPATIENT)
Dept: OBGYN CLINIC | Age: 35
End: 2020-08-13

## 2020-08-13 VITALS
DIASTOLIC BLOOD PRESSURE: 60 MMHG | BODY MASS INDEX: 34.95 KG/M2 | SYSTOLIC BLOOD PRESSURE: 112 MMHG | HEIGHT: 60 IN | WEIGHT: 178 LBS

## 2020-08-13 DIAGNOSIS — Z34.80 ENCOUNTER FOR SUPERVISION OF OTHER NORMAL PREGNANCY, UNSPECIFIED TRIMESTER: Primary | ICD-10-CM

## 2020-08-13 PROCEDURE — MISCGLOBALOB GLOBAL OB: Performed by: OBSTETRICS & GYNECOLOGY

## 2020-08-13 NOTE — PROGRESS NOTES
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

## 2020-08-13 NOTE — PATIENT INSTRUCTIONS
Semanas 30 a 32 de falcon embarazo: Instrucciones de cuidado  Weeks 30 to 32 of Your Pregnancy: Care Instructions  Instrucciones de cuidado    Ha llegado a los últimos meses de embarazo. A [place], falcon bebé en verdad comienza a tener la apariencia de un bebé, con cassandra y piel rellenita. A medida que entra en las últimas [facility]anas [facility] comenzará a caer en la realidad de que va a tener un bebé. Ankita es el momento de elegir un nombre, ordenar falcon casa, organizar un cuarto de niños seguro y buscar atención infantil de calidad, de ser necesaria. Hacer esto con anterioridad le permitirá concentrarse en cuidar y disfrutar de falcon bebé. También podría hacer isabela visita a la eligio de partos del hospital para [place]  mejor idea sobre qué esperar mientras está en el hospital.  Shanta estos últimos meses, es muy importante que se cuide y preste atención a lo que falcon cuerpo necesita. Si falcon médico le dice que está parag que trabaje, no se exija demasiado. Siga las recomendaciones proporcionadas en esta hoja de cuidados para aliviar la acidez estomacal y [address] várices. Si todavía no le schofield aplicado la vacuna Tdap (tétanos, difteria y tos [place]) shanta ankita [facility], hable con falcon médico acerca de aplicársela. Juanito Robert a proteger a falcon recién nacido contra la infección por tos ferina. La atención de seguimiento es isabela parte clave de falcon tratamiento y seguridad. Asegúrese de hacer y acudir a todas las citas, y llame a falcon médico si está teniendo problemas. También es isabela buena idea saber los resultados de ruslan exámenes y mantener isabela lista de los medicamentos que hiram. ¿Cómo puede cuidarse en el hogar? Preste atención a los movimientos de falcon bebé  · Debería sentir que falcon bebé se mueve varias veces al día. · Falcon bebé ahora cambia menos de posición, y patea y da golpes con más frecuencia. · Falcon bebé duerme de 20 a 45 minutos cada [facility] y [address] en determinados momentos del día.   · Si falcon médico quiere que cuente las patadas de falcon bebé:  ? Emelia [place] falcon vejiga y acuéstese de lado o recuéstese en isabela silla cómoda. ? Anote la hora inicial.  ? Preste atención solo a los movimientos de falcon bebé. Cuente todos los movimientos, excepto los del hipo. ? Después de que haya contado 10 movimientos, anote la hora. ? Anote cuántos minutos le llevaron a falcon bebé los 10 movimientos. ? Si después de isabela hora no ha registrado 10 movimientos, coma o shai algo, y luego cuente por otra hora. Si no registra 10 movimientos en cualquiera de las horas, llame a falcon médico.  Alivie la acidez estomacal  · Coma comidas pequeñas y frecuentes. · No coma chocolate, menta ni comidas muy picantes. Evite las bebidas con cafeína, merry el café, el té y las sodas. · Evite doblarse hacia adelante o acostarse después de comer. · Misbah isabela caminata corta después de comer. · Si tiene problemas de [place] estomacal shanta la noche, no coma por 2 horas antes de acostarse. · [place] antiácidos, merry Mylanta, Maalox, Rolaids o Tums. No tome antiácidos que contengan bicarbonato de sodio. Cuide las várices  · Las várices son vasos sanguíneos que se dilatan debido a la mayor cantidad de aixa shanta el embarazo. Puede tener dolor o palpitaciones en las piernas. La mayoría de las várices desaparecerán después del [place]. · Evite estar maddox shanta mucho tiempo. Siéntese con las piernas cruzadas a la altura de los tobillos, no de las rodillas. · Siéntese con los pies elevados. · Evite usar ropa o medias ajustadas. Use medias de compresión. · Misbah ejercicio con regularidad. Trate de caminar por lo menos 30 minutos al día. ¿Dónde puede encontrar más información en inglés? Vaya a [email or web address].info/  Cuco [id number] en la búsqueda para aprender más acerca de \"Semanas 30 a 28 de falcon embarazo: Instrucciones de cuidado. \"  Revisado: 29 leavitt, 2019Versión del contenido: 12.4  © 1398-4986 [employer].   [address] fueron adaptadas bajo licencia por Good [facility] Connections (which disclaims liability or warranty for this information). Si usted tiene [place] afección médica o sobre estas instrucciones, siempre pregunte a falcon profesional de maximilian. [facility], Incorporated niega toda garantía o responsabilidad por falcon uso de esta información.

## 2020-08-26 ENCOUNTER — ROUTINE PRENATAL (OUTPATIENT)
Dept: OBGYN CLINIC | Age: 35
End: 2020-08-26

## 2020-08-26 ENCOUNTER — HOSPITAL ENCOUNTER (OUTPATIENT)
Dept: PERINATAL CARE | Age: 35
Discharge: HOME | End: 2020-08-26
Attending: OBSTETRICS & GYNECOLOGY

## 2020-08-26 VITALS
DIASTOLIC BLOOD PRESSURE: 77 MMHG | BODY MASS INDEX: 33.46 KG/M2 | SYSTOLIC BLOOD PRESSURE: 122 MMHG | WEIGHT: 177.2 LBS | HEIGHT: 61 IN

## 2020-08-26 DIAGNOSIS — Z34.80 ENCOUNTER FOR SUPERVISION OF OTHER NORMAL PREGNANCY, UNSPECIFIED TRIMESTER: ICD-10-CM

## 2020-08-26 PROCEDURE — 76805 OB US >/= 14 WKS SNGL FETUS: CPT | Performed by: OBSTETRICS & GYNECOLOGY

## 2020-08-26 PROCEDURE — 0502F SUBSEQUENT PRENATAL CARE: CPT | Performed by: OBSTETRICS & GYNECOLOGY

## 2020-08-26 NOTE — PROGRESS NOTES
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

## 2020-08-26 NOTE — PATIENT INSTRUCTIONS
Weeks 32 to 34 of Your Pregnancy: Care Instructions  Your Care Instructions     During the last few weeks of your pregnancy, you may have more aches and pains. It's important to rest when you can. Your growing baby is putting more pressure on your bladder. So you may need to urinate more often. Hemorrhoids are also common. These are painful, itchy veins in the rectal area. In the 36th week, most women have a test for group B streptococcus (GBS). GBS is a common bacteria that can live in the vagina and rectum. It can make your baby sick after birth. If you test positive, you will get antibiotics during labor. These will keep your baby from getting the bacteria. You may want to talk with your doctor about banking your baby's umbilical cord blood. This is the blood left in the cord after birth. If you want to save this blood, you must arrange it ahead of time. You can't decide at the last minute. If you haven't already had the Tdap shot during this pregnancy, talk to your doctor about getting it. It will help protect your  against pertussis infection. Follow-up care is a key part of your treatment and safety. Be sure to make and go to all appointments, and call your doctor if you are having problems. It's also a good idea to know your test results and keep a list of the medicines you take. How can you care for yourself at home? Ease hemorrhoids  · Get more liquids, fruits, vegetables, and fiber in your diet. This will help keep your stools soft. · Avoid sitting for too long. Lie on your left side several times a day. · Clean yourself with soft, moist toilet paper. Or you can use witch hazel pads or personal hygiene pads. · If you are uncomfortable, try ice packs. Or you can sit in a warm sitz bath. Do these for 20 minutes at a time, as needed. · Use hydrocortisone cream for pain and itching. Two examples are Anusol and Preparation H Hydrocortisone.   · Ask your doctor about taking an over-the-counter stool softener. Consider breastfeeding  · Experts recommend that women breastfeed for 1 year or longer. · Breast milk may help protect your child from some health problems.  babies are less likely than formula-fed babies to:  ? Get ear infections, colds, diarrhea, and pneumonia. ? Be obese or get diabetes later in life. · Women who breastfeed have less bleeding after the birth. Their uteruses also shrink back faster. · Some women who breastfeed lose weight faster. Making milk burns calories. · Breastfeeding can lower your risk of breast cancer, ovarian cancer, and osteoporosis. Decide about circumcision for boys  · As you make this decision, it may help to think about your personal, Gnosticism, and family traditions. You get to decide if you will keep your son's penis natural or if he will be circumcised. · If you decide that you would like to have your baby circumcised, talk with your doctor. You can share your concerns about pain. And you can discuss your preferences for anesthesia. Where can you learn more? Go to http://[email or web address]-raymon.info/  Enter X711 in the search box to learn more about \"Weeks 32 to 34 of Your Pregnancy: Care Instructions. \"  Current as of: February 11, 2020               Content Version: 12.5  © 2989-2247 Healthwise, Incorporated. Care instructions adapted under license by [employer] (which disclaims liability or warranty for this information). If you have questions about a medical condition or this instruction, always ask your healthcare professional. [place] [address] any warranty or liability for your use of this information. Semanas 32 a 34 de falcon embarazo: Instrucciones de cuidado  Weeks 32 to 34 of Your Pregnancy: Care Instructions  Instrucciones de cuidado     [address] las [place] de falcon Case Dillard, usted podría sentir más arnie y [place]. Es importante que descanse cuando pueda.   Falcon bebé en crecimiento está ejerciendo más presión sobre falcon vejiga. Por eso, ernie vez necesite orinar con más frecuencia. Las hemorroides también son comunes. Estas son venas en la norma del recto que causan dolor y comezón. En la semana 36, a la mayoría de las [employer] un análisis para detectar el estreptococo del daniel B (GBS, por ruslan siglas en inglés). El GBS es isabela bacteria común que puede vivir en la vagina y el recto. Podría enfermar a falcon bebé después del nacimiento. Si el Mamad[employer] positivo, le darán antibióticos shanta el [place]. Estos prevendrán que el bebé se contagie con la bacteria. Podría convenirle hablar con falcon médico sobre poner en un banco la aixa del cordón umbilical de falcon bebé. Esta es la aixa que queda en el cordón después del nacimiento. Si desea guardar esta aixa, debe hacer los trámites necesarios con anticipación. No puede decidirlo en el último minuto. Si todavía no le schofield aplicado la vacuna Tdap (tétanos, difteria y tos [place]) shanta zen [place], hable con falcon médico acerca de aplicársela. Jakob Drilling a proteger a falcon recién nacido contra la infección por tos ferina. La atención de seguimiento es isabela parte clave de falcon tratamiento y seguridad. Asegúrese de hacer y acudir a todas las citas, y llame a falcon médico si está teniendo problemas. También es isabela buena idea saber los resultados de ruslan exámenes y mantener isabela lista de los medicamentos que hiram. ¿Cómo puede cuidarse en el hogar? Alivio de las hemorroides  · Aumente en falcon dieta la cantidad de líquidos, frutas, verduras y Masha. [place] ayudará a [employer]HA[employer] · Evite estar sentada shanta demasiado tiempo. Recuéstese sobre el lado pramod varias veces al día. · Límpiese con papel higiénico suave y húmedo. O puede utilizar compresas de infusión de [facility] (\"witch hazel\") o toallas de higiene personal.  · Si tiene malestar, pruebe a usar compresas de hielo.  O puede hacer yohana de asiento tibios. Hágalos shanta 20 minutos por vez, según lo necesite. · Use isabela crema con hidrocortisona para el dolor y la picazón. Neo Ng son Anusol y Preparation H Hydrocortisone. · Pregúntele a falcon médico si puede karl un ablandador de heces de venta brian. Considere el amamantamiento  · Los especialistas recomiendan que las mujeres amamanten por 1 año o [place]. · [place] ayudar a proteger a falcon hijo contra algunos problemas de maximilian. En comparación con los bebés que [address] de [facility], los bebés que adán leche materna tienen menos probabilidades de:  ? Stoney Norse infecciones de oído, resfriados, diarrea y neumonía. ? Ser obesos o tener diabetes más adelante en falcon krishan. · [employer] a gerri bebés tienen menos sangrado después del [place]. Gerri úteros también recobran falcon tamaño normal más rápido. · Algunas mujeres que amamantan bajan de Laurent rápido. Elaborar leche quema calorías. · El amamantamiento puede disminuir el riesgo de tener cáncer de seno (mama), cáncer de ovario y osteoporosis. Decida sobre la circuncisión para los niños  · Al karl esta decisión, podría ser de ayuda pensar en gerri tradiciones personales, religiosas y familiares. Es falcon decisión si desea conservar el pene de falcon hijo natural o circuncidar a falcon hijo. · Si decide que le gustaría que circuncidaran a falcon bebé, hable con falcon médico. Discuta cualquier inquietud que tenga sobre el dolor. También puede hablar acerca de gerri preferencias para la anestesia. ¿Dónde puede encontrar más información en inglés? Vaya a http://www.gray.com/  Cuco [id number] en la búsqueda para aprender más acerca de \"Semanas 28 a 29 de falcon embarazo: Instrucciones de cuidado. \"  Revisado: 11 febrero, 2020               Versión del contenido: 12.5  © 5098-3758 Healthwise, Incorporated.    Las instrucciones de cuidado fueron adaptadas bajo licencia por [employer] (which disclaims liability or warranty for this information). Si usted tiene [place] afección médica o sobre estas instrucciones, siempre pregunte a falcon profesional de maximilian. [facility]orporated niega toda garantía o responsabilidad por falcon uso de esta información.

## 2020-08-26 NOTE — PROGRESS NOTES
Doing well. Baby moving.  No contractions   Saw MFM - poly mild, suspicious for craniosynostosis  Fu in 2 weeks  Wants delivery at [facility] with BTL

## 2020-09-09 ENCOUNTER — ROUTINE PRENATAL (OUTPATIENT)
Dept: OBGYN CLINIC | Age: 35
End: 2020-09-09

## 2020-09-09 VITALS
BODY MASS INDEX: 33.95 KG/M2 | SYSTOLIC BLOOD PRESSURE: 128 MMHG | HEIGHT: 61 IN | WEIGHT: 179.8 LBS | DIASTOLIC BLOOD PRESSURE: 84 MMHG

## 2020-09-09 DIAGNOSIS — Z34.80 ENCOUNTER FOR SUPERVISION OF OTHER NORMAL PREGNANCY, UNSPECIFIED TRIMESTER: Primary | ICD-10-CM

## 2020-09-09 PROCEDURE — MISCGLOBALOB GLOBAL OB: Performed by: OBSTETRICS & GYNECOLOGY

## 2020-09-09 NOTE — PROGRESS NOTES
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

## 2020-09-09 NOTE — PROGRESS NOTES
Baby moving. Recent US at [facility] ? craniosynostosis    Sent information to Dr. Andino [facility] - they will contact her to set up repeat CS and BTL

## 2020-09-09 NOTE — PATIENT INSTRUCTIONS
Semanas 34 a 36 de falcon embarazo: Instrucciones de cuidado Weeks 34 to 36 of Your Pregnancy: Care Instructions Instrucciones de cuidado A estas [place], falcon bebé y falcon abdomen habrán crecido considerablemente. Rhea es Solis de uriel a mayra. Los pulmones de falcon bebé están rhea listos para respirar aire. Los huesos de la blas de falcon bebé ahora son bastante firmes merry para protegerla brody se mantienen lo suficientemente blandos merry para atravesar el canal de [place]. Es posible que sienta entusiasmo, susie, ansiedad o miedo. Quizá se pregunte cómo se dará cuenta de si está en trabajo de parto o qué esperar en mario momento. Trate de ser flexible con ruslan expectativas respecto del nacimiento. Dado que cada nacimiento es diferente, no hay manera de saber exactamente cómo será falcon parto. Esta hoja de cuidados la ayudará a saber qué esperar y cómo prepararse. Le podría facilitar el parto. Si todavía no le schofield aplicado la vacuna Tdap (tétanos, difteria y tos [place]) shanta zen [place], hable con falcon médico acerca de aplicársela. Justine Re a proteger a falcon recién nacido contra la infección por tos ferina. En la semana 36, a la mayoría de las mujeres se les hace isabela prueba de estreptococos del daniel B (GBS, por ruslan siglas en inglés). Los estreptococos del daniel B son bacterias comunes que pueden vivir en la vagina y el recto. Pueden hacer que falcon bebé se enferme después del parto. Si el resultado es positivo, usted recibirá antibióticos shanta el trabajo de [place]. Los medicamentos evitarán que falcon bebé contraiga las bacterias. La atención de seguimiento es isabela parte clave de falcon tratamiento y seguridad. Asegúrese de hacer y acudir a todas las citas, y llame a falcon médico si está teniendo problemas. También es isabela buena idea saber los resultados de ruslan exámenes y mantener isabela lista de los medicamentos que hiram. Cómo puede cuidarse en el hogar? Luz alternativas para aliviar el dolor · El dolor se manifiesta de modo diferente en cada jenn. Hable con falcon médico acerca de ruslan sentimientos sobre el dolor. · Puede elegir entre varias formas de aliviar el dolor. Estas incluyen medicamentos o técnicas de respiración, así merry medidas para estar cómoda. Usted puede utilizar más de Bret Pocatello opción. · Si elige un analgésico (medicamento para el dolor) richy el trabajo de [place], hable con falcon médico acerca de ruslan opciones. Algunos medicamentos reducen la ansiedad y [facility] a aliviar parte del dolor. Otros adormecen la parte inferior del cuerpo para que no sienta dolor. · Asegúrese de decirle a falcon médico acerca de falcon elección de analgésico antes de empezar el trabajo de parto o muy temprano en el [place]. Es posible que pueda cambiar de parecer a medida que avanza el [place] de [place]. · Alyssia vez se duerme a isabela jenn con medicamentos administrados a través de isabela máscara o por vía intravenosa (IV). Flateyri y Roger · La primera etapa del [place] de parto se divide en bal fases: latente, activa y de transición. ? La mayoría de las mujeres experimentan la fase latente del [place] de parto en ruslan hogares. Usted puede [employer] o descansar, comer refrigerios livianos, beber líquidos maría elena y comenzar a contar las contracciones. ? Cuando advierta que se le vuelve difícil hablar richy isabela contracción, es posible que esté por pasar a la fase activa. Richy la fase [place], debería ir al hospital si no está allí aún. ? Usted está en la fase activa cuando tiene contracciones cada 3 o 4 minutos y pompa alrededor de 60 segundos. El phill uterino comienza a abrirse con más rapidez. 
? Si se le rompe la natalya, las contracciones serán más intensas y más frecuentes. ? Richy la fase de transición, el phill uterino se estira y las contracciones se producen con Marlan Larger.  
? Chuck Coon tenga deseos de pujar, sin embargo es posible que el phill uterino aún no esté preparado. El médico le dirá cuándo pujar. · La segunda etapa comienza cuando el phill uterino se abre por completo y usted está lista para pujar. ? Las contracciones son muy intensas a fin de empujar al bebé por el canal de parto. ? Sentirá la necesidad de pujar. Podría sentir merry si tuviera ganas de evacuar el intestino. ? Tanda Brick entrenen a [employer]. Estas contracciones serán muy intensas brody no ocurrirán con tanta frecuencia. Puede descansar un poco entre contracciones. ? Es posible que esté sensible e irritable. Es posible que no se dé cuenta de lo que pasa a falcon alrededor. ? Un último esfuerzo y habrá nacido falcon bebé. · La tercera etapa ocurre cuando con unas cuantas contracciones más se expulsa la placenta. [place] puede durar 30 minutos o menos. · La cuarta etapa es la de recuperación. Es posible que se sienta abrumada con las emociones y exhausta brody alerta. Ankita es un buen momento para comenzar el amamantamiento. Dónde puede encontrar más información en inglés? Hina Lanre a http://www.gray.com/ Cuco [id number] en la búsqueda para aprender más acerca de \"Semanas 34 a 39 de falcon embarazo: Instrucciones de cuidado. \" Revisado: 11 febrero, 2020               Versión del contenido: 12.6 © 1484-5101 Healthwise, Incorporated. Las instrucciones de cuidado fueron adaptadas bajo licencia por [employer] (which disclaims liability or warranty for this information). Si usted tiene Garvin Gypsy afección médica o sobre estas instrucciones, siempre pregunte a falcon profesional de maximilian. Healthwise, Incorporated niega toda garantía o responsabilidad por falcon uso de esta información.

## 2020-09-16 ENCOUNTER — ROUTINE PRENATAL (OUTPATIENT)
Dept: OBGYN CLINIC | Age: 35
End: 2020-09-16

## 2020-09-16 VITALS
HEIGHT: 61 IN | BODY MASS INDEX: 33.64 KG/M2 | DIASTOLIC BLOOD PRESSURE: 88 MMHG | SYSTOLIC BLOOD PRESSURE: 119 MMHG | WEIGHT: 178.2 LBS

## 2020-09-16 DIAGNOSIS — Z34.80 ENCOUNTER FOR SUPERVISION OF OTHER NORMAL PREGNANCY, UNSPECIFIED TRIMESTER: Primary | ICD-10-CM

## 2020-09-16 LAB — GRBS, EXTERNAL: NEGATIVE

## 2020-09-16 PROCEDURE — 0502F SUBSEQUENT PRENATAL CARE: CPT | Performed by: OBSTETRICS & GYNECOLOGY

## 2020-09-16 NOTE — PROGRESS NOTES
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

## 2020-09-16 NOTE — PROGRESS NOTES
Baby moving.    Has CS scheduled at [facility] with BTL on 10/9, appt at [facility] on 9/30  GBS today  Will give her records next visit

## 2020-09-16 NOTE — PATIENT INSTRUCTIONS

## 2020-09-20 LAB — B-HEM STREP SPEC QL CULT: NEGATIVE

## 2020-09-23 ENCOUNTER — HOSPITAL ENCOUNTER (OUTPATIENT)
Dept: PERINATAL CARE | Age: 35
Discharge: HOME | End: 2020-09-23
Attending: OBSTETRICS & GYNECOLOGY

## 2020-09-23 ENCOUNTER — ROUTINE PRENATAL (OUTPATIENT)
Dept: OBGYN CLINIC | Age: 35
End: 2020-09-23

## 2020-09-23 VITALS
SYSTOLIC BLOOD PRESSURE: 121 MMHG | HEIGHT: 61 IN | WEIGHT: 177 LBS | DIASTOLIC BLOOD PRESSURE: 83 MMHG | BODY MASS INDEX: 33.42 KG/M2

## 2020-09-23 DIAGNOSIS — Z34.80 ENCOUNTER FOR SUPERVISION OF OTHER NORMAL PREGNANCY, UNSPECIFIED TRIMESTER: Primary | ICD-10-CM

## 2020-09-23 PROCEDURE — 76819 FETAL BIOPHYS PROFIL W/O NST: CPT | Performed by: OBSTETRICS & GYNECOLOGY

## 2020-09-23 PROCEDURE — 0502F SUBSEQUENT PRENATAL CARE: CPT | Performed by: OBSTETRICS & GYNECOLOGY

## 2020-09-23 PROCEDURE — 76816 OB US FOLLOW-UP PER FETUS: CPT | Performed by: OBSTETRICS & GYNECOLOGY

## 2020-09-23 RX ORDER — VALACYCLOVIR HYDROCHLORIDE 500 MG/1
500 TABLET, FILM COATED ORAL 2 TIMES DAILY
Qty: 60 TAB | Refills: 1 | Status: SHIPPED | OUTPATIENT
Start: 2020-09-23

## 2020-09-23 NOTE — PROGRESS NOTES
Baby moving  Will see [facility] next week then CS on 10/9.  Has 6 week fu here  Kay sent to April dorado and marthaer

## 2020-09-23 NOTE — PATIENT INSTRUCTIONS
Semana 40 de falcon embarazo: Instrucciones de cuidado  Week 37 of Your Pregnancy: Care Instructions  Instrucciones de cuidado    Usted está cerca del final de falcon embarazo, y probablemente esté bastante incómoda. Puede ser más difícil caminar. Acostarse probablemente tampoco sea cómodo. Podría tener dificultad para dormir o para permanecer dormida. La mayoría de las mujeres hans a mayra entre las 40 y 43 semanas. Ankita es un buen momento para pensar en preparar un maletín para el hospital con los artículos que necesitará. Entonces estará lista para cuando comience el Silvio Schooling de [place]. La atención de seguimiento es isabela parte clave de falcon tratamiento y seguridad. Asegúrese de hacer y acudir a todas las citas, y llame a falcon médico si está teniendo problemas. También es isabela buena idea saber los resultados de ruslan exámenes y mantener isabela lista de los medicamentos que hiram. ¿Cómo puede cuidarse en el hogar? Aprenda sobre el amamantamiento  · El amamantamiento es lo mejor para falcon bebé y Ancel Amelia es madden para usted. · La leche materna tiene anticuerpos que ayudan al bebé a combatir las infecciones. · Las madres que amamantan suelen bajar de peso más rápidamente, debido a que elaborar leche quema calorías. · Informarse acerca de las mejores maneras de sostener a falcon bebé le facilitará el amamantamiento. · Deje que falcon cathleen bañe y [employer] pañales del bebé para que no se sienta excluida. Acurrúquense juntos cuando amamante a falcon bebé. · Es posible que desee aprender a usar un sacaleches y guardar falcon [place]. · Si elige alimentar a falcon bebé con biberón, hágalo de la [employer] resulte más parecida al amamantamiento para que pueda establecer un vínculo con falcon bebé. Siempre sostenga al bebé y el biberón. No apoye el biberón ni deje que falcon bebé se quede dormido con él. Aprenda sobre el llanto  · Es normal que los bebés lloren de 1 a 3 horas al día. Algunos lloran más, otros menos.   · Los bebés no lloran para causarle molestias ni porque usted sea [employer]. · Llorar es la forma de comunicarse que tiene el bebé. Falcon bebé puede Holder Grumman o gases, necesitar un cambio de pañal, sentir frío o calor, sentirse solo o tenso. A veces, los bebés lloran por motivos desconocidos. · Si usted responde a las necesidades de falcon bebé, zen aprenderá a confiar en usted. · Intente mantener la calma cuando falcon bebé llore. Falcon bebé se puede sentir más molesto si siente que usted [place]. Sepa cómo cuidar a falcon recién nacido  · El muñón del cordón umbilical de falcon bebé se caerá solo, por lo general entre las semanas 1 y 2. Para cuidar la norma del cordón umbilical de falcon bebé:  ? Limpie la norma de la parte inferior del cordón umbilical 2 o 3 veces al día. ? Ponga especial atención en la norma en donde el cordón se fija a la piel. ? Mantenga el pañal doblado debajo del cordón. ? Utilice isabela toallita húmeda o algodón para darle un baño de esponja a falcon bebé hasta que se le haya caído el muñón. · La primera evacuación intestinal oscura de falcon bebé se conoce merry meconio. Después del meconio, el bebé tendrá ruslan propios hábitos de evacuación intestinal.  ? Algunos bebés, especialmente aquellos que se alimentan con [address], tienen varias evacuaciones al día. Otros tienen [employer] al día, o isabela cada 2 o 3 días. ? Los bebés que son amamantados a menudo tienen evacuaciones sueltas amarillentas. Los bebés que se alimentan con leche de fórmula evacuan heces más sólidas. ? Si falcon bebé tiene evacuaciones merry bolitas pequeñas, está estreñido. Después de 2 madyson de estreñimiento, llame al médico de falcon bebé. · Si falcon bebé va a ser Nely Choudhary, usted puede cuidarlo en el hogar. ? Enjuáguele delicadamente el pene con agua tibia cada vez que le cambie los pañales. No intente retirar la membrana que se forma sobre el pene. Esta membrana desaparecerá por sí daljit. Séquele la norma con toques suaves de toalla.   ? [employer] a base de petróleo, merry josianea, en la norma del pañal que tendrá contacto con el pene de falcon bebé. Coulter evitará que el pañal se le pegue al bebé. ? Pregúntele al médico acerca de darle acetaminofén (Tylenol) a falcon bebé para el dolor. ¿Dónde puede encontrar más información en inglés? Vaya a [email or web address]-raymon.info/  Liang Hilario [id number] en la búsqueda para aprender más acerca de \"Semana 40 de falcon embarazo: Instrucciones de cuidado. \"  Revisado: 11 febrero, 2020               Versión del contenido: 12.6  © 3971-1328 [employer]. Las instrucciones de cuidado fueron adaptadas bajo licencia por [employer] Connections (which disclaims liability or warranty for this information). Si usted tiene [place] afección médica o sobre estas instrucciones, siempre pregunte a falcon profesional de maximilian. [employer] niega toda garantía o responsabilidad por falcon uso de esta información.